# Patient Record
Sex: MALE | Race: WHITE
[De-identification: names, ages, dates, MRNs, and addresses within clinical notes are randomized per-mention and may not be internally consistent; named-entity substitution may affect disease eponyms.]

---
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
## 2023-03-29 ENCOUNTER — HOSPITAL ENCOUNTER (OUTPATIENT)
Dept: HOSPITAL 47 - LABWHC1 | Age: 18
Discharge: HOME | End: 2023-03-29
Attending: PEDIATRICS
Payer: COMMERCIAL

## 2023-03-29 DIAGNOSIS — E55.9: Primary | ICD-10-CM

## 2023-03-29 DIAGNOSIS — E78.5: ICD-10-CM

## 2023-03-29 DIAGNOSIS — L83: ICD-10-CM

## 2023-03-29 DIAGNOSIS — E66.9: ICD-10-CM

## 2023-03-29 LAB
ALBUMIN SERPL-MCNC: 5 G/DL (ref 3.8–4.9)
ALBUMIN/GLOB SERPL: 1.92 G/DL (ref 1.6–3.17)
ALP SERPL-CCNC: 93 U/L (ref 41–126)
ALT SERPL-CCNC: 71 U/L (ref 10–49)
ANION GAP SERPL CALC-SCNC: 15.5 MMOL/L (ref 10–18)
AST SERPL-CCNC: 28 U/L (ref 14–35)
BASOPHILS # BLD AUTO: 0.1 X 10*3/UL (ref 0–0.1)
BASOPHILS NFR BLD AUTO: 1.1 %
BUN SERPL-SCNC: 10.7 MG/DL (ref 9–27)
BUN/CREAT SERPL: 9.73 RATIO (ref 12–20)
CALCIUM SPEC-MCNC: 10.2 MG/DL (ref 8.7–10.3)
CHLORIDE SERPL-SCNC: 103 MMOL/L (ref 96–109)
CHOLEST SERPL-MCNC: 157 MG/DL (ref 0–200)
CO2 SERPL-SCNC: 23.5 MMOL/L (ref 20–27.5)
EOSINOPHIL # BLD AUTO: 0.29 X 10*3/UL (ref 0.04–0.35)
EOSINOPHIL NFR BLD AUTO: 3.3 %
ERYTHROCYTE [DISTWIDTH] IN BLOOD BY AUTOMATED COUNT: 5.43 X 10*6/UL (ref 4.4–5.6)
ERYTHROCYTE [DISTWIDTH] IN BLOOD: 12.9 % (ref 11.5–14.5)
GLOBULIN SER CALC-MCNC: 2.6 G/DL (ref 1.6–3.3)
GLUCOSE SERPL-MCNC: 85 MG/DL (ref 70–110)
HCT VFR BLD AUTO: 47.5 % (ref 39.6–50)
HDLC SERPL-MCNC: 26.9 MG/DL (ref 40–60)
HGB BLD-MCNC: 15.8 G/DL (ref 13–17)
IMM GRANULOCYTES BLD QL AUTO: 0.3 %
LDLC SERPL CALC-MCNC: 77.9 MG/DL (ref 0–131)
LYMPHOCYTES # SPEC AUTO: 3.41 X 10*3/UL (ref 0.9–5)
LYMPHOCYTES NFR SPEC AUTO: 38.3 %
MCH RBC QN AUTO: 29.1 PG (ref 27–32)
MCHC RBC AUTO-ENTMCNC: 33.3 G/DL (ref 32–37)
MCV RBC AUTO: 87.5 FL (ref 80–97)
MONOCYTES # BLD AUTO: 0.65 X 10*3/UL (ref 0.2–1)
MONOCYTES NFR BLD AUTO: 7.3 %
NEUTROPHILS # BLD AUTO: 4.42 X 10*3/UL (ref 1.8–7.7)
NEUTROPHILS NFR BLD AUTO: 49.7 %
NRBC BLD AUTO-RTO: 0 /100 WBCS (ref 0–0)
PLATELET # BLD AUTO: 229 X 10*3/UL (ref 140–440)
POTASSIUM SERPL-SCNC: 5.2 MMOL/L (ref 3.5–5.5)
PROT SERPL-MCNC: 7.6 G/DL (ref 6.2–8.2)
SODIUM SERPL-SCNC: 142 MMOL/L (ref 135–145)
T4 FREE SERPL-MCNC: 1.48 NG/DL (ref 0.83–1.43)
TRIGL SERPL-MCNC: 261 MG/DL (ref 0–149)
VLDLC SERPL CALC-MCNC: 52.2 MG/DL (ref 5–40)
WBC # BLD AUTO: 8.9 X 10*3/UL (ref 4.5–10)

## 2023-03-29 PROCEDURE — 84439 ASSAY OF FREE THYROXINE: CPT

## 2023-03-29 PROCEDURE — 80053 COMPREHEN METABOLIC PANEL: CPT

## 2023-03-29 PROCEDURE — 85025 COMPLETE CBC W/AUTO DIFF WBC: CPT

## 2023-03-29 PROCEDURE — 80061 LIPID PANEL: CPT

## 2023-03-29 PROCEDURE — 83036 HEMOGLOBIN GLYCOSYLATED A1C: CPT

## 2023-03-29 PROCEDURE — 84443 ASSAY THYROID STIM HORMONE: CPT

## 2023-03-29 PROCEDURE — 36415 COLL VENOUS BLD VENIPUNCTURE: CPT

## 2023-03-29 PROCEDURE — 82306 VITAMIN D 25 HYDROXY: CPT
